# Patient Record
Sex: MALE | Race: WHITE | NOT HISPANIC OR LATINO | Employment: FULL TIME | URBAN - METROPOLITAN AREA
[De-identification: names, ages, dates, MRNs, and addresses within clinical notes are randomized per-mention and may not be internally consistent; named-entity substitution may affect disease eponyms.]

---

## 2017-03-29 ENCOUNTER — HOSPITAL ENCOUNTER (EMERGENCY)
Facility: MEDICAL CENTER | Age: 22
End: 2017-03-29
Payer: MEDICAID

## 2017-03-29 VITALS
TEMPERATURE: 98.7 F | WEIGHT: 300.93 LBS | SYSTOLIC BLOOD PRESSURE: 156 MMHG | HEART RATE: 116 BPM | DIASTOLIC BLOOD PRESSURE: 90 MMHG | RESPIRATION RATE: 20 BRPM | OXYGEN SATURATION: 98 % | BODY MASS INDEX: 39.71 KG/M2

## 2017-03-29 PROCEDURE — 302449 STATCHG TRIAGE ONLY (STATISTIC)

## 2017-03-29 NOTE — ED NOTES
"Brien Abiola  21 y.o. male  Chief Complaint   Patient presents with   • Chest Injury     Right sided. 7/10 nonradiating pain. Pt states, \"it feels like eggshells under my skin.\"     Pt amb to triage with steady gait for above complaint. Pt BIB [facility] from home. Pt denies SOB. Pt denies EtOH and recreational drug use. Pt is alert and oriented, speaking in full sentences, follows commands and responds appropriately to question.  Pt placed in lobby. Pt educated on triage process. Pt encouraged to alert staff for any changes.    "

## 2019-04-25 ENCOUNTER — HOSPITAL ENCOUNTER (EMERGENCY)
Facility: MEDICAL CENTER | Age: 24
End: 2019-04-25
Attending: EMERGENCY MEDICINE
Payer: MEDICAID

## 2019-04-25 VITALS
BODY MASS INDEX: 35.07 KG/M2 | HEIGHT: 70 IN | TEMPERATURE: 98.6 F | RESPIRATION RATE: 16 BRPM | SYSTOLIC BLOOD PRESSURE: 123 MMHG | HEART RATE: 80 BPM | OXYGEN SATURATION: 98 % | WEIGHT: 245 LBS | DIASTOLIC BLOOD PRESSURE: 69 MMHG

## 2019-04-25 DIAGNOSIS — F43.10 PTSD (POST-TRAUMATIC STRESS DISORDER): ICD-10-CM

## 2019-04-25 DIAGNOSIS — F29 PSYCHOSIS, UNSPECIFIED PSYCHOSIS TYPE (HCC): ICD-10-CM

## 2019-04-25 LAB
AMPHET UR QL SCN: NEGATIVE
BARBITURATES UR QL SCN: NEGATIVE
BENZODIAZ UR QL SCN: NEGATIVE
BZE UR QL SCN: NEGATIVE
CANNABINOIDS UR QL SCN: POSITIVE
METHADONE UR QL SCN: NEGATIVE
OPIATES UR QL SCN: NEGATIVE
OXYCODONE UR QL SCN: NEGATIVE
PCP UR QL SCN: NEGATIVE
POC BREATHALIZER: 0 PERCENT (ref 0–0.01)
PROPOXYPH UR QL SCN: NEGATIVE

## 2019-04-25 PROCEDURE — 90791 PSYCH DIAGNOSTIC EVALUATION: CPT

## 2019-04-25 PROCEDURE — 302970 POC BREATHALIZER: Performed by: EMERGENCY MEDICINE

## 2019-04-25 PROCEDURE — 80307 DRUG TEST PRSMV CHEM ANLYZR: CPT

## 2019-04-25 PROCEDURE — 99284 EMERGENCY DEPT VISIT MOD MDM: CPT

## 2019-04-25 RX ORDER — ARIPIPRAZOLE 20 MG/1
20 TABLET ORAL 2 TIMES DAILY
Status: SHIPPED | COMMUNITY
End: 2021-12-10

## 2019-04-26 NOTE — ED PROVIDER NOTES
"ED Provider Note    Scribed for Austyn Harvey D.O. by Florinda Epstein. 4/25/2019  6:00 PM    Primary care provider: Alonzo Carter  Means of arrival: ambulance  History obtained from: patient  History limited by: none    CHIEF COMPLAINT  Chief Complaint   Patient presents with   • Psychiatric Evaluation       HPI  Brien Culver is a 23 y.o. male who presents to the Emergency Department from the correction following a few weeks of incarceration for a psychiatric evaluation. Per EMS reports, patient was in the correction and began to throw himself into a wall, banging the front of his head against a wall and screaming. Patient reports he was doing this secondary to missing his family and actively denies suicidal ideation or homicidal ideation at this time. Patient has psychiatric history of bipolar affection and PTSD for which he manages with Abilify, Lexapro and Trileptal. He reports no recent missed doses.   Per nursing note, patient is having flight of idea. He denies suicidal or homicidal ideation with them as well, however, he will mutter and talk to himself when left alone and is endorsing \"having 17 different personalities\" at the moment. Patient was already placed on a legal by PD.  No complaints of chest pain, shortness of breath and fever.    REVIEW OF SYSTEMS  Pertinent positives include fleeting thoughts, erratic behavior. Pertinent negatives include no SI, HI, chest pain, shortness of breath and fever.  All other systems reviewed and negative.    PAST MEDICAL HISTORY  Past Medical History:   Diagnosis Date   • Bipolar affective (HCC)    • Psychiatric disorder        SURGICAL HISTORY  History reviewed. No pertinent surgical history.     SOCIAL HISTORY  Social History   Substance Use Topics   • Smoking status: Current Every Day Smoker     Packs/day: 1.50     Types: Cigarettes   • Alcohol use No      History   Drug Use   • Types: Inhaled     Comment: marijuana 4months ago       FAMILY HISTORY  History " "reviewed. No pertinent family history.    CURRENT MEDICATIONS  Home Medications     Reviewed by Haily Green R.N. (Registered Nurse) on 04/25/19 at 1749  Med List Status: Not Addressed   Medication Last Dose Status   aripiprazole (ABILIFY) 10 MG TABS  Active   escitalopram (LEXAPRO) 10 MG Tab  Active   hydrocodone-acetaminophen (NORCO) 5-325 MG Tab per tablet  Active   ibuprofen (MOTRIN) 600 MG Tab  Active   oxcarbazepine (TRILEPTAL) 600 MG tablet  Active   terbinafine (LAMISIL) 250 MG Tab  Active                ALLERGIES  No Known Allergies    PHYSICAL EXAM  VITAL SIGNS: /70   Pulse 86   Temp 37 °C (98.6 °F)   Resp 16   Ht 1.778 m (5' 10\")   Wt 111.1 kg (245 lb)   BMI 35.15 kg/m²     Nursing notes and vitals reviewed.  Constitutional: Well developed, Well nourished, No acute distress, Non-toxic appearance.   Eyes: PERRLA, EOMI, Conjunctiva normal, No discharge.   Cardiovascular: Normal heart rate, Normal rhythm, No murmurs, No rubs, No gallops.   Thorax & Lungs: No respiratory distress, No rales, No rhonchi, No wheezing, No chest tenderness.   Abdomen: Bowel sounds normal, Soft, No tenderness, No guarding, No rebound, No masses, No pulsatile masses.   Skin: Warm, Dry, No erythema, No rash.   Musculoskeletal: Intact distal pulses, No edema, No cyanosis, No clubbing. Good range of motion in all major joints. No tenderness to palpation or major deformities noted, no CVA tenderness, no midline back tenderness.   Neurologic: Alert & oriented x 3, Normal motor function, Normal sensory function, No focal deficits noted.  Psychiatric: Affect normal for clinical presentation.    DIAGNOSTIC STUDIES/PROCEDURES    LABS  Results for orders placed or performed during the hospital encounter of 04/25/19   POC BREATHALIZER   Result Value Ref Range    POC Breathalizer 0.001 0.00 - 0.01 Percent     All labs reviewed by me.      COURSE & MEDICAL DECISION MAKING  Pertinent Labs & Imaging studies reviewed. (See chart for " details)    6:00 PM - Patient seen and examined at bedside. Ordered urine drug screen and breathalyzer to evaluate his symptoms. I informed the patient that he would be medically cleared then evaluated by [facility] for appropriate management. Patient understands and agrees with treatment plan.    This is a charming 23 y.o. male that presents with psychosis.  The patient is medically stable my evaluation.  She will be evaluated by [facility] individuals for further evaluation and possible transfer to a local psychiatric facility for acute psychosis.  The patient does not have any homicidal or suicidal ideation currently.   [facility] and we do believe the patient is not at risk currently for suicidal homicidal ideation.  He does have PTSD and had a slight attack of this.  The patient now is alert and oriented and we discharged with follow-up with [facility] as well as [facility]  DISPOSITION:  Patient will remain in the ED until appropriate psychiatric management.    FINAL IMPRESSION  Acute Psychosis  Posttraumatic stress disease   Florinda TUTTLE (Eloina), am scribing for, and in the presence of, Austyn Harvey D.O    Electronically signed by: Florinda Epstein (Jeffibaustin), 4/25/2019    IAustyn D.O. personally performed the services described in this documentation, as scribed by lForinda Epstein in my presence, and it is both accurate and complete.    The note accurately reflects work and decisions made by me.  Austyn Harvey  4/25/2019  6:38 PM      1

## 2019-04-26 NOTE — ED TRIAGE NOTES
Pt barbara from skilled nursing after being discharged from skilled nursing pt started to throw self into wall and scream, per ems pt told police that he was going to hurt self, pt presently denies SI/HI at this time.  Pt has hx of pscho effective disorder and bipolar with multiple personalities.  Pt was placed on legal and sent by police to be evaluated
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
## 2019-05-23 NOTE — ED NOTES
BH called for evaluation  
[facility] at bedside.  
Med rec complete per patient and PAR [facility] custodial  Allergies reviewed  No PO antibiotics in last 30 days    Patient was released 4-25-19 AM so did not receive his morning medications   
Pt ambulated to restroom with nurse, belongings removed and placed in bag, security called for belongings search, pt placed back in room, room cleared for SI/HI and only equipment in room that is needed for pt per [facility] policy, pt belonging placed in locker 5, pt is presently in room pacing at times.  
Pt presently sitting up, eating a sandwich , resp even and nonlabored, bed in lowest position  
Pt provided with discharge instructions and education. Verbalizes understanding. Provided with belongings. Denies any questions or concerns at this time. Ambulates independently to lobby.  
Pt provided with phone and means to call his brother for a ride upon discharge.  
Report received from YNES Johansen. Pt care responsibilities assumed.  
Jad Serna

## 2021-12-10 ENCOUNTER — HOSPITAL ENCOUNTER (EMERGENCY)
Facility: MEDICAL CENTER | Age: 26
End: 2021-12-11
Attending: EMERGENCY MEDICINE
Payer: MEDICAID

## 2021-12-10 DIAGNOSIS — Z00.8 MEDICAL CLEARANCE FOR PSYCHIATRIC ADMISSION: ICD-10-CM

## 2021-12-10 DIAGNOSIS — F28 OTHER PSYCHOTIC DISORDER NOT DUE TO SUBSTANCE OR KNOWN PHYSIOLOGICAL CONDITION (HCC): ICD-10-CM

## 2021-12-10 LAB — POC BREATHALIZER: 0 PERCENT (ref 0–0.01)

## 2021-12-10 PROCEDURE — 700111 HCHG RX REV CODE 636 W/ 250 OVERRIDE (IP): Performed by: EMERGENCY MEDICINE

## 2021-12-10 PROCEDURE — 700111 HCHG RX REV CODE 636 W/ 250 OVERRIDE (IP)

## 2021-12-10 PROCEDURE — 99285 EMERGENCY DEPT VISIT HI MDM: CPT

## 2021-12-10 PROCEDURE — 90791 PSYCH DIAGNOSTIC EVALUATION: CPT

## 2021-12-10 PROCEDURE — 302970 POC BREATHALIZER: Performed by: EMERGENCY MEDICINE

## 2021-12-10 PROCEDURE — 96372 THER/PROPH/DIAG INJ SC/IM: CPT

## 2021-12-10 RX ORDER — ARIPIPRAZOLE 5 MG/1
5 TABLET ORAL DAILY
Status: SHIPPED | COMMUNITY
End: 2022-01-25

## 2021-12-10 RX ORDER — HALOPERIDOL 5 MG/ML
5 INJECTION INTRAMUSCULAR ONCE
Status: COMPLETED | OUTPATIENT
Start: 2021-12-10 | End: 2021-12-10

## 2021-12-10 RX ORDER — HALOPERIDOL 5 MG/ML
INJECTION INTRAMUSCULAR
Status: COMPLETED
Start: 2021-12-10 | End: 2021-12-10

## 2021-12-10 RX ORDER — LORAZEPAM 2 MG/ML
2 INJECTION INTRAMUSCULAR ONCE
Status: COMPLETED | OUTPATIENT
Start: 2021-12-10 | End: 2021-12-10

## 2021-12-10 RX ORDER — LORAZEPAM 2 MG/ML
INJECTION INTRAMUSCULAR
Status: COMPLETED
Start: 2021-12-10 | End: 2021-12-10

## 2021-12-10 RX ORDER — GABAPENTIN 600 MG/1
600 TABLET ORAL 3 TIMES DAILY
Status: SHIPPED | COMMUNITY
End: 2022-05-31 | Stop reason: SDUPTHER

## 2021-12-10 RX ORDER — LAMOTRIGINE 100 MG/1
100 TABLET ORAL 2 TIMES DAILY
Status: SHIPPED | COMMUNITY
End: 2022-08-23 | Stop reason: SDUPTHER

## 2021-12-10 RX ADMIN — LORAZEPAM 2 MG: 2 INJECTION INTRAMUSCULAR; INTRAVENOUS at 15:11

## 2021-12-10 RX ADMIN — LORAZEPAM 2 MG: 2 INJECTION INTRAMUSCULAR at 17:55

## 2021-12-10 RX ADMIN — LORAZEPAM 2 MG: 2 INJECTION INTRAMUSCULAR; INTRAVENOUS at 17:55

## 2021-12-10 RX ADMIN — HALOPERIDOL LACTATE 5 MG: 5 INJECTION, SOLUTION INTRAMUSCULAR at 17:45

## 2021-12-10 RX ADMIN — HALOPERIDOL 5 MG: 5 INJECTION INTRAMUSCULAR at 15:12

## 2021-12-10 RX ADMIN — HALOPERIDOL LACTATE 5 MG: 5 INJECTION, SOLUTION INTRAMUSCULAR at 15:12

## 2021-12-10 NOTE — ED PROVIDER NOTES
ED Provider Note    Scribed for Koby Peña M.D. by Ana Park. 12/10/2021, 1:55 PM.    Primary care provider: Alonzo Carter M.D.  Means of arrival: EMS  History obtained from: patient   History limited by: acute psychosis    CHIEF COMPLAINT  Chief Complaint   Patient presents with   • Medical Clearance     Came from [place] on legal for psychotic like behavior. denies any HI/SI. Pt has meds with them       HPI  Brien Culver is a 26 y.o. male who presents to the Emergency Department for psychiatric evaluation. Patient has a past medical history of schizophrenia. He states he has not been taking his medications. Denies SI or HI.   Denies any other acute medical problems or complaints.    Further history of present illness cannot be obtained due to the patient's acute psychosis      REVIEW OF SYSTEMS  Review of Systems   Psychiatric/Behavioral: Negative for suicidal ideas.        Positive for schizophrenia       Further ROS cannot be obtained due to the patient's acute psychosis      PAST MEDICAL HISTORY   has a past medical history of Bipolar affective (HCC) and Psychiatric disorder.    SURGICAL HISTORY  patient denies any surgical history    SOCIAL HISTORY  Social History     Tobacco Use   • Smoking status: Current Every Day Smoker     Packs/day: 1.50     Types: Cigarettes   Substance Use Topics   • Alcohol use: No   • Drug use: Yes     Types: Inhaled     Comment: marijuana 4months ago      Social History     Substance and Sexual Activity   Drug Use Yes   • Types: Inhaled    Comment: marijuana 4months ago       FAMILY HISTORY  History reviewed. No pertinent family history.  Denies any history of diabetes          CURRENT MEDICATIONS  Home Medications     Reviewed by Master Cole R.N. (Registered Nurse) on 12/10/21 at 1408  Med List Status: Not Addressed   Medication Last Dose Status   aripiprazole (ABILIFY) 20 MG tablet  Active   oxcarbazepine (TRILEPTAL) 600 MG tablet  Active           "      ALLERGIES  No Known Allergies    PHYSICAL EXAM  VITAL SIGNS: /97   Pulse (!) 124   Temp 36.4 °C (97.6 °F) (Tympanic)   Resp 18   Ht 1.88 m (6' 2\")   Wt 112 kg (247 lb)   SpO2 97%   BMI 31.71 kg/m²   Vitals reviewed.  Constitutional: Well developed, Well nourished, No acute distress, Non-toxic appearance.   HENT: Normocephalic, Atraumatic,  Eyes: PERRL, EOMI, Conjunctiva normal, No discharge.   Neck: Normal range of motion, No tenderness, Supple, No stridor.   Cardiovascular: Normal heart rate, Normal rhythm, No murmurs, No rubs, No gallops.   Thorax & Lungs: Normal breath sounds, No respiratory distress, No wheezing  Abdomen: Bowel sounds normal, Soft, No tenderness  Skin: Warm, Dry, No erythema, No rash.   Back: No tenderness, No CVA tenderness.   Musculoskeletal: Good range of motion in all major joints.   Neurologic: Alert, No focal deficits noted.   Psychiatric: Affect normal      COURSE & MEDICAL DECISION MAKING  Pertinent Labs & Imaging studies reviewed. (See chart for details)    1:55 PM Patient seen and examined at bedside. The patient presents with psychiatric evaluation, and the differential diagnosis includes but is not limited to decompensated schizophrenia versus substance abuse.. Patient will undergo psychiatric evaluation. Patients breathalyzer was negative per nursing.    2:16 PM Patient admitted to ED Observation at this time on 12/10/21 pending psychiatric evaluation and placement.    The patient will be will be seen by [facility].  Anticipate transfer to psychiatric hospital for decompensated schizophrenia    3:00 PM Patient is increasingly agitated and psychotic. He will be treated with Haldol 5 mg and Ativan 2 mg.  He is pacing and agitated.  Connected reviewed vital signs.  Will not answer answer questions.  He appears delusional talking about God and angels.    Breathalyzer is negative.  X-ray is pending.  Pending [facility] evaluation.  He will be turned over to my " partner for further work-up and treatment pending [facility] evaluation.    FINAL IMPRESSION  1. Medical clearance for psychiatric admission    2. Other psychotic disorder not due to substance or known physiological condition (HCC)          Ana TUTTLE (Scribe), am scribing for, and in the presence of, Koby Peña M.D..    Electronically signed by: Ana Park (Scribe), 12/10/2021    Koby TUTTLE M.D. personally performed the services described in this documentation, as scribed by Ana Park in my presence, and it is both accurate and complete.    The note accurately reflects work and decisions made by me.  Koby Peña M.D.  12/10/2021  2:26 PM

## 2021-12-10 NOTE — ED TRIAGE NOTES
"Brien Culver  26 y.o. male  Chief Complaint   Patient presents with   • Medical Clearance     Came from [place] on legal for psychotic like behavior. denies any HI/SI. Pt has meds with them       Vitals:    12/10/21 1406   BP: 152/97   Pulse: (!) 124   Resp: 18   Temp: 36.4 °C (97.6 °F)   SpO2: 97%        Patient BIBA from [place] on Legal after pt sought help for internal stimulations reporting auditory hallucinations. Pt has history of Schizophrenia. On arrival pt blew .000 on breathalyzer. Pacing in room    Pt on arrival is talking to internal stimulation, walking about in the hallways. Pt at this time is redirectable with multiple attempts. Pt states that \"you are holding me against my rights, this is not [employer] rights.\"     Of note, this RN and patient are in proper PPE and has a mask in place at all times during this encounter.     Past Medical History:   Diagnosis Date   • Bipolar affective (HCC)    • Psychiatric disorder         "

## 2021-12-10 NOTE — ED NOTES
Med Rec completed per patient and patient's home pharmacy ([facility])  Allergies reviewed  No ORAL antibiotics in last 30 days

## 2021-12-11 VITALS
HEIGHT: 74 IN | RESPIRATION RATE: 18 BRPM | SYSTOLIC BLOOD PRESSURE: 146 MMHG | BODY MASS INDEX: 31.7 KG/M2 | HEART RATE: 89 BPM | WEIGHT: 247 LBS | TEMPERATURE: 98.4 F | DIASTOLIC BLOOD PRESSURE: 78 MMHG | OXYGEN SATURATION: 97 %

## 2021-12-11 PROCEDURE — 700102 HCHG RX REV CODE 250 W/ 637 OVERRIDE(OP): Performed by: EMERGENCY MEDICINE

## 2021-12-11 PROCEDURE — A9270 NON-COVERED ITEM OR SERVICE: HCPCS | Performed by: EMERGENCY MEDICINE

## 2021-12-11 RX ORDER — ARIPIPRAZOLE 5 MG/1
5 TABLET ORAL DAILY
Status: DISCONTINUED | OUTPATIENT
Start: 2021-12-11 | End: 2021-12-11 | Stop reason: HOSPADM

## 2021-12-11 RX ORDER — LAMOTRIGINE 100 MG/1
100 TABLET ORAL 2 TIMES DAILY
Status: DISCONTINUED | OUTPATIENT
Start: 2021-12-11 | End: 2021-12-11 | Stop reason: HOSPADM

## 2021-12-11 RX ORDER — GABAPENTIN 300 MG/1
600 CAPSULE ORAL 3 TIMES DAILY
Status: DISCONTINUED | OUTPATIENT
Start: 2021-12-11 | End: 2021-12-11 | Stop reason: HOSPADM

## 2021-12-11 RX ADMIN — LAMOTRIGINE 100 MG: 100 TABLET ORAL at 10:18

## 2021-12-11 RX ADMIN — ARIPIPRAZOLE 5 MG: 5 TABLET ORAL at 10:54

## 2021-12-11 RX ADMIN — GABAPENTIN 600 MG: 300 CAPSULE ORAL at 10:18

## 2021-12-11 NOTE — ED NOTES
Pt awake, sitting upright in bed, speaking without signs of distress. States he feels much better after being able to sleep last pm. Morning meal tray provided. PT tolerated PO medications without complaint and without incident.

## 2021-12-11 NOTE — PROGRESS NOTES
"ED Provider Progress Note    ED Observation Progress Note    Date of Service: 12/11/21    Interval History  Patient reevaluated.  Patient is on a legal hold, waiting transfer to psychiatric facility when a bed is available.  Please refer to initial note for complete details.  Episode of agitation requiring Haldol and Ativan yesterday, no further concerns overnight.  Patient ambulates to the bathroom independently and tolerated a meal.  Medication reconciliation has been reviewed, home medications have been reordered which include Lamictal, Abilify and Neurontin.  Hopefully this helps with his psychosis.      Physical Exam  /78   Pulse 81   Temp 36.9 °C (98.5 °F) (Temporal)   Resp 15   Ht 1.88 m (6' 2\")   Wt 112 kg (247 lb)   SpO2 98%   BMI 31.71 kg/m² .    Constitutional: Awake and alert. Nontoxic  HENT:  Grossly normal  Eyes: Grossly normal  Neck: Normal range of motion  Cardiovascular: Normal heart rate   Thorax & Lungs: No respiratory distress  Abdomen: Nontender  Skin:  No pathologic rash.   Extremities: Well perfused  Psychiatric: Affect normal    Labs  Results for orders placed or performed during the hospital encounter of 12/10/21   POC BREATHALIZER   Result Value Ref Range    POC Breathalizer 0.000 0.00 - 0.01 Percent       Radiology  No orders to display       Problem List  1. Acute psychosis: Awaiting transfer to psychiatric facility when a bed is available.      Electronically signed by: Yessy Bailey D.O., 12/11/2021 9:51 AM    "

## 2021-12-11 NOTE — ED NOTES
Patient resting in bed, chest rise and fall. No needs at this time. 1:1 sitter in direct view of patient.

## 2021-12-11 NOTE — DISCHARGE INSTRUCTIONS
Follow-up with primary care and behavioral health next week for reevaluation, medication management.    Continue home medications as previously indicated.  Encourage compliance with medications.    Return to the emergency department for suicidal homicidal ideation, hallucination or delusions, altered mental status or other new concerns.

## 2021-12-11 NOTE — DISCHARGE PLANNING
[facility] ED Behavioral Health Fax Referral      [facility] ED Behavioral Health Alert Team:  [phone]    Referral: Legal Hold    Intervention: Patient referral to [facility] inpatient  facillity    Legal Hold Initiated: Date:12/10/2021  Time: 1145    Patient’s Insurance Listed on Face Sheet: medicaid ffs    Referrals sent to: [facility] dilan rodriguez    Referrals faxed by saeed murray    This referral contains the following information:  1) Face sheet _x___  2) Page 1 and Page 2 of Legal Hold x____  3) Alert Team Assessment/Psych Assessment _x___  4) Head to toe physical exam _x___  5) Urine Drug Screen _pending___  6) Blood Alcohol __x__  7) Vital signs __x__  8) Pregnancy test when applicable na___  9) Medications list __x__  10) Covid screening _na___    Plan: Patient will transfer to mental health facility once acceptance is obtained

## 2021-12-11 NOTE — ED NOTES
Patient's home medications have been reviewed by the pharmacy team.     Past Medical History:   Diagnosis Date   • Bipolar affective (HCC)    • Psychiatric disorder        Patient's Medications   New Prescriptions    No medications on file   Previous Medications    ARIPIPRAZOLE (ABILIFY) 5 MG TABLET    Take 5 mg by mouth every day.    GABAPENTIN (NEURONTIN) 600 MG TABLET    Take 600 mg by mouth 3 times a day.    LAMOTRIGINE (LAMICTAL) 100 MG TAB    Take 100 mg by mouth 2 times a day.   Modified Medications    No medications on file   Discontinued Medications    ARIPIPRAZOLE (ABILIFY) 20 MG TABLET    Take 20 mg by mouth 2 Times a Day.    OXCARBAZEPINE (TRILEPTAL) 600 MG TABLET    Take 600 mg by mouth 2 times a day.          A:  Medications do not appear to be contributing to current complaints.         P:    No recommendations at this time. Home medications have been reordered by [facility].        Brandi Lai, PharmD

## 2021-12-11 NOTE — DISCHARGE PLANNING
Alert Team:    This writer received a phone call from [facility] calling to confirm that this pt was still waiting for a bed. Staff is going to consult with doctor regarding this pt and give us a call back.

## 2021-12-11 NOTE — ED NOTES
"Alert team RN at bedside to interview patient and pt started yelling, becoming more and more agitated, refusing to answer questions and yelling \"I just want to get out of here and go home!\" Pt denies any HI/SI. Received verbal orders from ERP for medications to be administered IM - 5 Haldol, 2 Ativan. This is patients second time getting these medications.   "

## 2021-12-11 NOTE — ED PROVIDER NOTES
ED Provider Note    Addendum:    The patient again became agitated.  He was restrained for his protection and the protection of the staff.  He was given 5 mg IM Haldol and 2 mg IM Ativan.    6:01 PM I spoke with Liam from the alert team, we agreed the patient will require legal hold, he is responding to internal stimuli, he is aggressive and a danger to himself and others.  Urine drug screen has been ordered and is pending.    11:30 PM patient signed out to Dr. Syed Vega awaiting transfer to psychiatric facility.        Electronically signed by Dr. Mckinley Solomon
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
## 2021-12-11 NOTE — ED NOTES
Pt resting in bed, chest rise and fall. No needs at this time. 1:1 sitter in direct view of patient.

## 2021-12-11 NOTE — ED NOTES
Pt awake, sitting upright in bed, speaking without signs of distress. A/Ox4. States understanding of discharge instructions. Pt has alert team resources and states understanding.

## 2021-12-11 NOTE — DISCHARGE SUMMARY
"  ED Observation Discharge Summary    Patient:Brien Culver  Patient : 1995  Patient MRN: [id number]  Patient PCP: Alonzo Carter M.D.    Admit Date: 12/10/2021  Discharge Date and Time: 21 11:23 AM  Discharge Diagnosis: Nonspecific psychosis  Discharge Attending: Yessy Bailey D.O.  Discharge Service: ED Observation    ED Course  Brien is a 26 y.o. male who was evaluated at [facility] for psychosis.  Patient has been followed here in the emergency department by psychiatry.  Legal hold has been discontinued.  Please refer to her note for complete details.  Patient has outpatient follow-up and plenty of medications, compliance is encouraged.    Discharge Exam:  /78   Pulse 81   Temp 36.9 °C (98.5 °F) (Temporal)   Resp 15   Ht 1.88 m (6' 2\")   Wt 112 kg (247 lb)   SpO2 98%   BMI 31.71 kg/m² .    Constitutional: Awake and alert. Nontoxic  HENT:  Grossly normal  Eyes: Grossly normal  Neck: Normal range of motion  Cardiovascular: Normal peripheral perfusion.  Thorax & Lungs: Nonlabored respirations.  Skin: Warm and dry.  Extremities: Moves 4 extremity spontaneously.  Psychiatric: Cooperative.    Labs  Results for orders placed or performed during the hospital encounter of 12/10/21   POC BREATHALIZER   Result Value Ref Range    POC Breathalizer 0.000 0.00 - 0.01 Percent       Radiology  No orders to display       Disposition: To home after legal hold was discontinued by psychiatry    Follow up: No follow-ups on file.    Medications:   New Prescriptions    No medications on file       Discharge Condition: Stable    Electronically signed by: Yessy Bailey D.O., 2021 11:23 AM     "

## 2021-12-11 NOTE — CONSULTS
[facility]   TRIAGE ASSESSMENT    Name: Brien Culver  MRN: [id number]  : 1995  Age: 26 y.o.  Date of assessment: 12/10/2021  PCP: Alonzo Carter M.D.  Persons in attendance: Patient  Patient Location: [facility]    CHIEF COMPLAINT/PRESENTING ISSUE (as stated by pt, micaela,rn): This 26y male presents in the er on a legal hold from [facility]. He apparently presented at that hospital for some sort of help with psychiatric issues. Instead, his [facility] provider found him to be actively psychotic and he was placed on a hold and transferred to the [facility] er via ems. In this er, he continued to demonstrate psychosis and agitation. Later, even after being medicated with haldol and ativan he again became very agitated and paranoid during his alert interview. He also appeared to be responding to internal stimuli. He was pacing in his room and posturing and required more im haldol and ativan to help deter his symptoms. This pt's last er admission was in . He was transferred to the er after discharge from the longterm psychiatric unit after a week. In the longterm it was documented that he was very agitated and was banging his head against the wall. But upon release to the er he had improved and was placed in iop treatment at [facility]. His treatment hx since then is unknown. His past dx was bipolar disorder and PTSD. He also now demonstrates a schizoaffective element to that dx.     Chief Complaint   Patient presents with   • Medical Clearance     Came from [place] on legal for psychotic like behavior. denies any HI/SI. Pt has meds with them        CURRENT LIVING SITUATION/SOCIAL SUPPORT/FINANCIAL RESOURCES: This pt is very vague about his social support network. He claims he is from [place] and appears to be living in the [place] area for some time. He appear to stay on the couches of friends for shelter and does not appear to be employed. He also notes that he has a 15 mo child and  has contact and a good relationship with her mother. His old emr mentions that he also has a local brother that is very supportive. But he became very agitated during the interview and screamed that he was being detained against his rights. He was also yelling very loudly and it was impossible to have a lucid conversation with him. It appears that his overall social support is nominal.    BEHAVIORAL HEALTH/SUBSTANCE USE TREATMENT HISTORY  Does patient/parent report a history of prior behavioral health/substance use treatment for patient? No other treatment hx at this time.  Yes:    Dates Level of Care Facilty/Provider Diagnosis/Problem Medications   2019 inpt [facility] psy treamrnt  psychosis na   2019 op [facility] Bipolar and ptss lexapro  abilifty and trilepal                                                                 SAFETY ASSESSMENT - SELF  Does patient acknowledge current or past symptoms of dangerousness to self or is previous history noted? no  Does parent/significant other report patient has current or past symptoms of dangerousness to self? N\A  Does presenting problem suggest symptoms of dangerousness to self? No pt denies si and no other hx noted. He denies hx of self harm or plan to harm himself. No other self safety determinates available presently.    SAFETY ASSESSMENT - OTHERS  Does patient acknowledge current or past symptoms of aggressive behavior or risk to others or is previous history noted? no  Does parent/significant other report patient has current or past symptoms of aggressive behavior or risk to others?  N\A  Does presenting problem suggest symptoms of dangerousness to others? No pt denies any hi or plan to harm others.    LEGAL HISTORY  Does patient acknowledge history of arrest/senior living/[facility] or is previous history noted? Yes pt was in [facility] senior living for a week but unable to determine any other specifics at this time.    Crisis Safety Plan completed and copy given to patient?  "no    ABUSE/NEGLECT SCREENING  Does patient report feeling “unsafe” in his/her home, or afraid of anyone?  no  Does patient report any history of physical, sexual, or emotional abuse?  Possible: pt has a documented hx of ptsd but would not give any details.  Does parent or significant other report any of the above? N\A  Is there evidence of neglect by self?  no  Is there evidence of neglect by a caregiver? no  Does the patient/parent report any history of CPS/APS/police involvement related to suspected abuse/neglect or domestic violence? na  Based on the information provided during the current assessment, is a mandated report of suspected abuse/neglect being made?  No    SUBSTANCE USE SCREENING  Yes:  Liam all substances used in the past 30 days:denies      Last Use Amount   []   Alcohol     []   Marijuana     []   Heroin     []   Prescription Opioids  (used without prescription, for    recreation, or in excess of prescribed amount)     []   Other Prescription  (used without prescription, for    recreation, or in excess of prescribed amount)     []   Cocaine      []   Methamphetamine     []   \"\" drugs (ectasy, MDMA)     []   Other substances        UDS results: pending has a hx of cannabis art in past chart.   Breathalyzer results:0.00    What consequences does the patient associate with any of the above substance use and or addictive behaviors? None    Risk factors for detox (check all that apply):  []  Seizures   []  Diaphoretic (sweating)   []  Tremors   []  Hallucinations   []  Increased blood pressure   []  Decreased blood pressure   []  Other   []  None      [] Patient education on risk factors for detoxification and instructed to return to ER as needed.      MENTAL STATUS   Participation: Limited verbal participation  Grooming: Casual  Orientation: Alert and Evidence of hallucinations present  Behavior: Agitated, Tense and Aggressive  Eye contact: Limited  Mood: Depressed, Anxious, Angry, Manic and " "Irritable  Affect: Constricted, Labile, Incongruent with content, Sad, Anxious and Angry  Thought process: Circumstantial  Thought content: Paranoia  Speech: Rate within normal limits, Volume within normal limits, Loud and Pressured  Perception: Evidence of auditory hallucination  Memory:  No gross evidence of memory deficits  Insight: Poor  Judgment:  Poor  Other:    Collateral information:   Source:  [] Significant other present in person:   [] Significant other by telephone  [] [facility]   [x] [facility] Nursing Staff  [x] [facility]  [x] Other: erp    [] Unable to complete full assessment due to:  [] Acute intoxication  [x] Patient declined to participate/engage  [] Patient verbally unresponsive  [] Significant cognitive deficits  [] Significant perceptual distortions or behavioral disorganization  [] Other:      CLINICAL IMPRESSIONS:  Primary:acute psychosi  Secondary: anxiety/depression        IDENTIFIED NEEDS/PLAN:  [Trigger DISPOSITION list for any items marked]    []  Imminent safety risk - self [] Imminent safety risk - others   []  Acute substance withdrawal []  Psychosis/Impaired reality testing   []  Mood/anxiety []  Substance use/Addictive behavior   []  Maladaptive behaviro []  Parent/child conflict   []  Family/Couples conflict []  Biomedical   []  Housing []  Financial   []   Legal  Occupational/Educational   []  Domestic violence []  Other:     Recommended Plan of Care:  Legal holdl transfer to [facility] or [facility].  *Telesitter may not be utilized for moderate or high risk patients    Has the Recommended Plan of Care/Level of Observation been reviewed with the patient's assigned nurse? Yes 1:1 sitter with LOS    Does patient/parent or guardian express agreement with the above plan? No wants to leave the hospital    Referral appointment(s) scheduled? no    Alert team only:   I have discussed findings and recommendations with Dr. Snyder who is in agreement with these " recommendations. 26 y male presents with active psychosis and has potential to harm others and not be able to care for himself. His aggressiveness pose a potential danger to the community.    Referral information sent to the following inpatient community providers : [facility]    Referral information sent to the following inpatient community providers :na    If applicable : Referred  to  Alert Team for legal hold follow up at (time):1316      Liam Dutton R.N.  12/10/2021

## 2021-12-11 NOTE — ED NOTES
Patient resting in bed, chest rise and fall. NAD. 1:1 sitter in direct view of patient. All needs met at this time. Gurney in lowest position and locked.

## 2021-12-11 NOTE — DISCHARGE PLANNING
ALERT team  note:  26 year old male admitted 12/10/21,legal hold, inability to care for self; Medicaid HPN insurance plan (initially noted as Medicaid FFS but verified today as HPN); pt evaluated by contracted [facility] psychiatrist, Dr. Bah, with legal hold DC'd; no SI, HI, or self-harm ideation noted; with organized thoughts and behaviors; writer RN reviewed community  resources with  pt, with written information given, including [facility], [facility] [place]), [facility], and [facility] transportation included in pt's insurance plan; with pt verbal consent, writer RN to send pt referral to [facility] [place]; pt verbalized understanding; pt to DC to to self to home today

## 2021-12-11 NOTE — ED NOTES
Patient awake, sitting up in bed, chest rise and fall. All needs met at this time. Gurney in lowest position and locked. Instructed pt to call for assistance if needed. 1:1 sitter in direct view of patient.

## 2023-08-24 PROBLEM — R53.83 FATIGUE: Status: ACTIVE | Noted: 2023-08-24

## 2023-08-24 PROBLEM — E88.819 INSULIN RESISTANCE: Status: ACTIVE | Noted: 2023-08-24

## 2023-08-24 PROBLEM — N62 GYNECOMASTIA: Status: ACTIVE | Noted: 2023-08-24

## 2023-12-08 PROBLEM — G47.33 OBSTRUCTIVE SLEEP APNEA SYNDROME: Status: ACTIVE | Noted: 2023-12-08

## 2024-03-25 ENCOUNTER — APPOINTMENT (OUTPATIENT)
Dept: ADMISSIONS | Facility: MEDICAL CENTER | Age: 29
End: 2024-03-25
Attending: INTERNAL MEDICINE
Payer: MEDICAID

## 2024-03-28 ENCOUNTER — PRE-ADMISSION TESTING (OUTPATIENT)
Dept: ADMISSIONS | Facility: MEDICAL CENTER | Age: 29
End: 2024-03-28
Attending: INTERNAL MEDICINE
Payer: MEDICAID

## 2024-03-28 VITALS — BODY MASS INDEX: 49.7 KG/M2 | HEIGHT: 75 IN

## 2024-03-28 DIAGNOSIS — Z01.812 PRE-OPERATIVE LABORATORY EXAMINATION: ICD-10-CM

## 2024-03-28 DIAGNOSIS — Z01.810 PRE-OPERATIVE CARDIOVASCULAR EXAMINATION: ICD-10-CM

## 2024-03-28 RX ORDER — QUETIAPINE FUMARATE 200 MG/1
200 TABLET, FILM COATED ORAL NIGHTLY
COMMUNITY

## 2024-03-28 RX ORDER — OMEPRAZOLE 40 MG/1
40 CAPSULE, DELAYED RELEASE ORAL
COMMUNITY
Start: 2024-03-17

## 2024-03-28 RX ORDER — CARIPRAZINE 6 MG/1
6 CAPSULE, GELATIN COATED ORAL DAILY
COMMUNITY

## 2024-03-28 RX ORDER — COLESTIPOL HYDROCHLORIDE 5 G/5G
5 GRANULE, FOR SUSPENSION ORAL
COMMUNITY

## 2024-03-28 RX ORDER — IBUPROFEN 200 MG
600-800 TABLET ORAL EVERY 6 HOURS PRN
COMMUNITY

## 2024-03-28 RX ORDER — ACETAMINOPHEN 500 MG
500-1000 TABLET ORAL EVERY 6 HOURS PRN
COMMUNITY
End: 2024-05-24

## 2024-03-28 RX ORDER — LAMOTRIGINE 200 MG/1
200 TABLET ORAL DAILY
COMMUNITY
Start: 2024-02-14

## 2024-03-28 RX ORDER — PRAZOSIN HYDROCHLORIDE 2 MG/1
2 CAPSULE ORAL NIGHTLY
COMMUNITY
Start: 2024-03-27

## 2024-03-28 NOTE — PREPROCEDURE INSTRUCTIONS
Pt preadmitted via phone, instructions emailed. Questions answered.Patient instructed per pharmacy guidelines regarding taking, holding or contacting provider for instructions on regularly prescribed medications before surgery. Instructed to take the following medications the day of surgery with a sip of water per pharmacy medication guidelines-vraylar, breo ellipta inh, lamictal, omeprazole,and if needed- ventolin inh, duoneb nebulizer, tylenol, atarax. METs score >4. Hx of CHARISSE per PCP, but no testing or devices as yet.

## 2024-04-18 ENCOUNTER — PRE-ADMISSION TESTING (OUTPATIENT)
Dept: ADMISSIONS | Facility: MEDICAL CENTER | Age: 29
End: 2024-04-18
Attending: INTERNAL MEDICINE
Payer: MEDICAID

## 2024-04-18 DIAGNOSIS — Z01.810 PRE-OPERATIVE CARDIOVASCULAR EXAMINATION: ICD-10-CM

## 2024-04-18 DIAGNOSIS — Z01.812 PRE-OPERATIVE LABORATORY EXAMINATION: ICD-10-CM

## 2024-04-18 LAB
ANION GAP SERPL CALC-SCNC: 12 MMOL/L (ref 7–16)
BUN SERPL-MCNC: 11 MG/DL (ref 8–22)
CALCIUM SERPL-MCNC: 9.2 MG/DL (ref 8.4–10.2)
CHLORIDE SERPL-SCNC: 104 MMOL/L (ref 96–112)
CO2 SERPL-SCNC: 21 MMOL/L (ref 20–33)
CREAT SERPL-MCNC: 0.96 MG/DL (ref 0.5–1.4)
EKG IMPRESSION: NORMAL
EST. AVERAGE GLUCOSE BLD GHB EST-MCNC: 114 MG/DL
GFR SERPLBLD CREATININE-BSD FMLA CKD-EPI: 111 ML/MIN/1.73 M 2
GLUCOSE SERPL-MCNC: 130 MG/DL (ref 65–99)
HBA1C MFR BLD: 5.6 % (ref 4–5.6)
POTASSIUM SERPL-SCNC: 3.9 MMOL/L (ref 3.6–5.5)
SODIUM SERPL-SCNC: 137 MMOL/L (ref 135–145)

## 2024-04-18 PROCEDURE — 36415 COLL VENOUS BLD VENIPUNCTURE: CPT

## 2024-04-18 PROCEDURE — 93005 ELECTROCARDIOGRAM TRACING: CPT

## 2024-04-18 PROCEDURE — 83036 HEMOGLOBIN GLYCOSYLATED A1C: CPT

## 2024-04-18 PROCEDURE — 93010 ELECTROCARDIOGRAM REPORT: CPT | Performed by: INTERNAL MEDICINE

## 2024-04-18 PROCEDURE — 80048 BASIC METABOLIC PNL TOTAL CA: CPT

## 2024-06-18 ENCOUNTER — TELEPHONE (OUTPATIENT)
Dept: CARDIOLOGY | Facility: MEDICAL CENTER | Age: 29
End: 2024-06-18
Payer: MEDICAID

## 2024-06-18 DIAGNOSIS — R94.31 NONSPECIFIC ABNORMAL ELECTROCARDIOGRAM (ECG) (EKG): ICD-10-CM

## 2024-06-18 NOTE — TELEPHONE ENCOUNTER
----- Message from AL De La O sent at 6/18/2024 11:04 AM PDT -----  Regarding: RE: Stress Test Referral  We can change to order to chemical if patient agrees. Thank you  ----- Message -----  From: Kareen Mercer  Sent: 6/18/2024  10:55 AM PDT  To: AL De La O  Subject: Stress Test Referral                             Hi Yessy. I want to reach out to you about your patient's treadmill stress test referral. I called earlier to schedule Brien. His wife answered and informed me that he's had a recent back injury and doesn't feel comfortable doing a treadmill. I just wanted to see what you would like to do moving forward for him.

## 2024-06-18 NOTE — TELEPHONE ENCOUNTER
Phone number called: [phone] (home)      Call outcome: I spoke to pt he agrees of changing the stress test to chemical as it is uncomfortable to walk because of his back injury. Per JG's note Ok to change to chemical if pt agrees. Stress test ordered.     To YISSEL: ~Just FYI. Thank you.